# Patient Record
Sex: FEMALE | Race: WHITE | NOT HISPANIC OR LATINO | Employment: OTHER | URBAN - METROPOLITAN AREA
[De-identification: names, ages, dates, MRNs, and addresses within clinical notes are randomized per-mention and may not be internally consistent; named-entity substitution may affect disease eponyms.]

---

## 2022-03-30 NOTE — PROGRESS NOTES
"Chief Complaint  Sleep Apnea    Subjective          Karen Lerner presents to [facility] NEUROLOGY  History of Present Illness   Patient is currently sleeps with CPAP machine  She wears nasal mask  Gets supplies from [employer]    With dream station 2  avg use 10 hr 16 min, 99%>4 hours.  avg large leak <5 min  On cpap 11 ahi 3.4     pt has had fransisca for  30 years, recently received a new machine   The patient c/o daytime sleepiness issues:  excessive daytime sleepiness,  without CPAP. .      The patient complains of snoring loud in all sleeping positions.with out CPAP     Pt has leg cramps at times.     Sleep schedule: Bedtime:7:30 , gets out of bed at 7AM, sleep latency: 1 MIN, Gets about 11 hours of sleep.    EPWORTH SLEEPINESS SCALE  Sitting and reading 1 WatchingTV 1  Sitting, inactive, in a public place 0  As a passenger in a car for 1 hour w/o a break  1  Lying down to rest in the afternoon  3  Sitting and talking to someone  0  Sitting quietly after a lunch  0  In a car, while stopped for traffic or a light  0  Total 6    Objective   Vital Signs:   Temp 97.1 °F (36.2 °C)   Ht 160 cm (63\")   Wt 99.3 kg (219 lb)   BMI 38.79 kg/m²     Physical Exam  Vitals reviewed.   Cardiovascular:      Rate and Rhythm: Normal rate.   Pulmonary:      Effort: Pulmonary effort is normal.   Neurological:      General: No focal deficit present.      Mental Status: She is alert and oriented to person, place, and time.   Psychiatric:         Mood and Affect: Mood normal.        Result Review :{Labs  Result Review  Imaging  Med Tab  Media :23}                 Assessment and Plan    Diagnoses and all orders for this visit:    1. FRANSISCA (obstructive sleep apnea) (Primary)    Will increase CPAP pressure to 12.      Follow Up   Return in about 1 year (around 3/31/2023).  Patient was given instructions and counseling regarding her condition or for health maintenance advice. Please see specific information pulled into " the AVS if appropriate.       This document has been electronically signed by Joseph Seipel, MD on March 31, 2022 14:53 EDT

## 2022-03-31 ENCOUNTER — OFFICE VISIT (OUTPATIENT)
Dept: NEUROLOGY | Facility: CLINIC | Age: 82
End: 2022-03-31

## 2022-03-31 VITALS — BODY MASS INDEX: 38.8 KG/M2 | TEMPERATURE: 97.1 F | WEIGHT: 219 LBS | HEIGHT: 63 IN

## 2022-03-31 DIAGNOSIS — G47.33 OSA (OBSTRUCTIVE SLEEP APNEA): Primary | ICD-10-CM

## 2022-03-31 PROBLEM — R06.00 DYSPNEA: Status: ACTIVE | Noted: 2018-06-19

## 2022-03-31 PROBLEM — I34.81 MITRAL ANNULAR CALCIFICATION: Status: ACTIVE | Noted: 2021-09-21

## 2022-03-31 PROBLEM — I50.31 ACUTE DIASTOLIC HEART FAILURE: Status: ACTIVE | Noted: 2018-06-15

## 2022-03-31 PROBLEM — I35.0 AORTIC VALVE STENOSIS: Status: ACTIVE | Noted: 2017-10-03

## 2022-03-31 PROBLEM — Z85.3 HX: BREAST CANCER: Status: ACTIVE | Noted: 2017-06-15

## 2022-03-31 PROBLEM — I48.0 PAROXYSMAL ATRIAL FIBRILLATION: Status: ACTIVE | Noted: 2017-11-01

## 2022-03-31 PROBLEM — I50.22 CHRONIC SYSTOLIC CONGESTIVE HEART FAILURE: Status: ACTIVE | Noted: 2017-07-01

## 2022-03-31 PROBLEM — Z95.3 S/P TAVR (TRANSCATHETER AORTIC VALVE REPLACEMENT), BIOPROSTHETIC: Status: ACTIVE | Noted: 2017-12-19

## 2022-03-31 PROBLEM — R74.01 TRANSAMINITIS: Status: ACTIVE | Noted: 2017-10-04

## 2022-03-31 PROBLEM — E78.5 DYSLIPIDEMIA: Status: ACTIVE | Noted: 2021-04-06

## 2022-03-31 PROBLEM — E66.01 MORBID OBESITY WITH BMI OF 40.0-44.9, ADULT: Status: ACTIVE | Noted: 2019-10-31

## 2022-03-31 PROCEDURE — 99203 OFFICE O/P NEW LOW 30 MIN: CPT | Performed by: PSYCHIATRY & NEUROLOGY

## 2022-03-31 RX ORDER — FUROSEMIDE 40 MG/1
40 TABLET ORAL 2 TIMES DAILY
COMMUNITY
Start: 2022-03-12

## 2022-03-31 RX ORDER — LIRAGLUTIDE 6 MG/ML
INJECTION SUBCUTANEOUS
COMMUNITY
Start: 2022-03-21

## 2022-03-31 RX ORDER — APIXABAN 5 MG/1
5 TABLET, FILM COATED ORAL 2 TIMES DAILY
COMMUNITY
Start: 2022-01-10

## 2022-03-31 RX ORDER — MELOXICAM 15 MG/1
1 TABLET ORAL DAILY PRN
COMMUNITY
Start: 2021-11-08 | End: 2022-11-08

## 2022-03-31 RX ORDER — SIMVASTATIN 20 MG
20 TABLET ORAL NIGHTLY
COMMUNITY
Start: 2022-01-09

## 2022-03-31 RX ORDER — CARVEDILOL 6.25 MG/1
6.25 TABLET ORAL 2 TIMES DAILY WITH MEALS
COMMUNITY
Start: 2022-02-05

## 2022-03-31 RX ORDER — POTASSIUM CHLORIDE 750 MG/1
10 CAPSULE, EXTENDED RELEASE ORAL 2 TIMES DAILY
COMMUNITY
Start: 2022-03-12

## 2022-03-31 RX ORDER — TRIAMCINOLONE ACETONIDE 1 MG/G
CREAM TOPICAL
COMMUNITY
Start: 2022-03-23

## 2022-12-09 ENCOUNTER — APPOINTMENT (OUTPATIENT)
Dept: CT IMAGING | Facility: HOSPITAL | Age: 82
End: 2022-12-09

## 2022-12-09 ENCOUNTER — HOSPITAL ENCOUNTER (EMERGENCY)
Facility: HOSPITAL | Age: 82
Discharge: HOME | End: 2022-12-09
Attending: EMERGENCY MEDICINE | Admitting: EMERGENCY MEDICINE

## 2022-12-09 VITALS
OXYGEN SATURATION: 97 % | WEIGHT: 197 LBS | HEART RATE: 70 BPM | HEIGHT: 63 IN | RESPIRATION RATE: 16 BRPM | SYSTOLIC BLOOD PRESSURE: 128 MMHG | DIASTOLIC BLOOD PRESSURE: 52 MMHG | BODY MASS INDEX: 34.91 KG/M2

## 2022-12-09 DIAGNOSIS — R22.1 NECK MASS: Primary | ICD-10-CM

## 2022-12-09 LAB
ANION GAP SERPL CALCULATED.3IONS-SCNC: 8.7 MMOL/L (ref 5–15)
BASOPHILS # BLD AUTO: 0.02 10*3/MM3 (ref 0–0.2)
BASOPHILS NFR BLD AUTO: 0.3 % (ref 0–1.5)
BUN SERPL-MCNC: 13 MG/DL (ref 8–23)
BUN/CREAT SERPL: 12.3 (ref 7–25)
CALCIUM SPEC-SCNC: 9 MG/DL (ref 8.6–10.5)
CHLORIDE SERPL-SCNC: 96 MMOL/L (ref 98–107)
CO2 SERPL-SCNC: 33.3 MMOL/L (ref 22–29)
CREAT SERPL-MCNC: 1.06 MG/DL (ref 0.57–1)
DEPRECATED RDW RBC AUTO: 47.6 FL (ref 37–54)
EGFRCR SERPLBLD CKD-EPI 2021: 51.9 ML/MIN/1.73
EOSINOPHIL # BLD AUTO: 0.09 10*3/MM3 (ref 0–0.4)
EOSINOPHIL NFR BLD AUTO: 1.3 % (ref 0.3–6.2)
ERYTHROCYTE [DISTWIDTH] IN BLOOD BY AUTOMATED COUNT: 13.1 % (ref 12.3–15.4)
GLUCOSE SERPL-MCNC: 114 MG/DL (ref 65–99)
HCT VFR BLD AUTO: 39.1 % (ref 34–46.6)
HGB BLD-MCNC: 12.5 G/DL (ref 12–15.9)
IMM GRANULOCYTES # BLD AUTO: 0.01 10*3/MM3 (ref 0–0.05)
IMM GRANULOCYTES NFR BLD AUTO: 0.1 % (ref 0–0.5)
LYMPHOCYTES # BLD AUTO: 1.59 10*3/MM3 (ref 0.7–3.1)
LYMPHOCYTES NFR BLD AUTO: 23.7 % (ref 19.6–45.3)
MCH RBC QN AUTO: 31.3 PG (ref 26.6–33)
MCHC RBC AUTO-ENTMCNC: 32 G/DL (ref 31.5–35.7)
MCV RBC AUTO: 98 FL (ref 79–97)
MONOCYTES # BLD AUTO: 0.68 10*3/MM3 (ref 0.1–0.9)
MONOCYTES NFR BLD AUTO: 10.1 % (ref 5–12)
NEUTROPHILS NFR BLD AUTO: 4.32 10*3/MM3 (ref 1.7–7)
NEUTROPHILS NFR BLD AUTO: 64.5 % (ref 42.7–76)
PLATELET # BLD AUTO: 122 10*3/MM3 (ref 140–450)
PMV BLD AUTO: 10.6 FL (ref 6–12)
POTASSIUM SERPL-SCNC: 3.5 MMOL/L (ref 3.5–5.2)
RBC # BLD AUTO: 3.99 10*6/MM3 (ref 3.77–5.28)
SODIUM SERPL-SCNC: 138 MMOL/L (ref 136–145)
WBC NRBC COR # BLD: 6.71 10*3/MM3 (ref 3.4–10.8)

## 2022-12-09 PROCEDURE — 85025 COMPLETE CBC W/AUTO DIFF WBC: CPT | Performed by: EMERGENCY MEDICINE

## 2022-12-09 PROCEDURE — 80048 BASIC METABOLIC PNL TOTAL CA: CPT | Performed by: EMERGENCY MEDICINE

## 2022-12-09 PROCEDURE — 99283 EMERGENCY DEPT VISIT LOW MDM: CPT | Performed by: EMERGENCY MEDICINE

## 2022-12-09 PROCEDURE — 99283 EMERGENCY DEPT VISIT LOW MDM: CPT

## 2022-12-09 RX ORDER — CLINDAMYCIN HYDROCHLORIDE 300 MG/1
300 CAPSULE ORAL 3 TIMES DAILY
Qty: 21 CAPSULE | Refills: 0 | Status: SHIPPED | OUTPATIENT
Start: 2022-12-09 | End: 2022-12-16

## 2022-12-09 NOTE — FSED PROVIDER NOTE
[facility]    Pt Name: Karen Lerner  MRN: [id number]  Birthdate 1940  Date of evaluation: 12/9/2022  Provider: Geronimo Greco MD     CHIEF CONCERN     Chief Complaint   Patient presents with   • Swollen Glands       HISTORY OF PRESENT ILLNESS   Says yesterday she thought she noticed some left-sided neck swelling.  Says today it seemed to more swollen.  At a point she wanted further evaluation.  Says it slightly uncomfortable.  She denies history of the same is not really sure if she has not been checking her neck.  No fevers, chills, night sweats.  No difficulty swallowing.  Cannot think of any inciting event.  No voice changes.    REVIEW OF SYSTEMS     Constitutional: No fevers. No chills. No fatigue.  HENT: No sore throat. No congestion.  Eye: No vision changes.  Respiratory: No cough. No dyspnea. No wheezing.  Cardiovascular: No chest pain. No palpitations. No lightheadedness. No leg swelling.  GI: No abdominal pain. No diarrhea. No constipation. No nausea. No vomiting.  : No frequency. No dysuria. No urgency.  MSK: No arthralgias. No myalgias.  Skin: No rashes.   Neuro: No numbness. No tingling. No weakness. No headache.  Psych: No suicidal ideation. No homicidal ideation.    PAST MEDICAL HISTORY     Past Medical History:   Diagnosis Date   • Diabetes mellitus (HCC)    • Sleep apnea        SURGICAL HISTORY       Past Surgical History:   Procedure Laterality Date   • CARDIAC VALVE REPLACEMENT         CURRENT MEDICATIONS          Medication List      START taking these medications    clindamycin 300 MG capsule  Commonly known as: CLEOCIN  Take 1 capsule by mouth 3 (Three) Times a Day for 7 days.        ASK your doctor about these medications    carvedilol 6.25 MG tablet  Commonly known as: COREG     CBD oral oil  Commonly known as: cannabidiol     Eliquis 5 MG tablet tablet  Generic drug: apixaban     furosemide 40 MG tablet  Commonly known as: LASIX     potassium  "chloride 10 MEQ CR capsule  Commonly known as: MICRO-K     simvastatin 20 MG tablet  Commonly known as: ZOCOR     triamcinolone 0.1 % cream  Commonly known as: KENALOG     TURMERIC PO     Victoza 18 MG/3ML solution pen-injector injection  Generic drug: Liraglutide           Where to Get Your Medications      These medications were sent to [id number] - [place], [place] - [address] AT [place] [address] [address] [place] - [phone] [facility] [phone] FX  [address], [place] [address]    Phone: [phone]   · clindamycin 300 MG capsule         ALLERGIES     Amoxicillin    FAMILY HISTORY     No family history on file.     SOCIAL HISTORY       Social History     Socioeconomic History   • Marital status: Single   Tobacco Use   • Smoking status: Former   • Smokeless tobacco: Former   Vaping Use   • Vaping Use: Never used   Substance and Sexual Activity   • Alcohol use: Not Currently   • Drug use: Never   • Sexual activity: Defer       SCREENINGS           PHYSICAL EXAM      Vitals:    12/09/22 1248   BP: 128/52   BP Location: Right arm   Patient Position: Sitting   Pulse: 70   Resp: 16   SpO2: 97%   Weight: 89.4 kg (197 lb)   Height: 160 cm (63\")     General: Significant central adiposity. Nontoxic. Conversational. Appears stated age.  Skin: Warm. Dry. Intact. No systemic rashes or lesions.  Eyes: Pupils are equally round and reactive to light. Extraocular motions are intact. Clear sclera. No gaze preference.  HENT: Atraumatic. Normocephalic. Trachea midline. Mucosal membranes moist. Posterior oropharynx without erythema or exudate.  Neck habitus limits full evaluation but there is a palpable left submandibular and upper neck mass which seems to be fairly well rounded.  It is seems to be approximately 3 x 4 cm in size. Slightly tender.  Suspicious for enlarged submandibular gland.  No trismus or malocclusion.  Uvula midline.  No stridor.  No tenderness percussion of teeth.  " Secretions normally.  Do not appreciate any adenopathy.  Lungs: Clear to auscultation throughout. Nonlabored breathing.  Cardiovascular: No murmurs, rubs, or gallops appreciated. No pedal edema. No JVD. Symmetric radial pulses. Symmetric dorsal pedis pulses.   Abdomen: Soft and nontender. Nondistended. Bowel sounds are present.  Musculoskeletal: No asymmetry. No deformities. No effusions.  Neuro: Alert. Oriented to person, place, year. No facial asymmetry. Facial sensation symmetric. No aphasia. No dysarthria. Midline tongue protrusion. Posterior oropharynx with symmetric elevation. Ambulatory with cane. Purposeful use of upper extremities.   Psych: Appropriate. Cooperative.    REVIEWED DIAGNOSTIC RESULTS     RADIOLOGY   No radiology results for the last day      LABS:  Labs Reviewed   BASIC METABOLIC PANEL - Abnormal; Notable for the following components:       Result Value    Glucose 114 (*)     Creatinine 1.06 (*)     Chloride 96 (*)     CO2 33.3 (*)     eGFR 51.9 (*)     All other components within normal limits    Narrative:     GFR Normal >60  Chronic Kidney Disease <60  Kidney Failure <15    The GFR formula is only valid for adults with stable renal function between ages 18 and 70.   CBC WITH AUTO DIFFERENTIAL - Abnormal; Notable for the following components:    MCV 98.0 (*)     Platelets 122 (*)     All other components within normal limits   CBC AND DIFFERENTIAL    Narrative:     The following orders were created for panel order CBC & Differential.  Procedure                               Abnormality         Status                     ---------                               -----------         ------                     CBC Auto Differential[[id number]]        Abnormal            Final result                 Please view results for these tests on the individual orders.       All other labs were within normal range or not returned as of this dictation.     EMERGENCY DEPARTMENT COURSE and DIFFERENTIAL  DIAGNOSIS/MDM:     · Nursing notes were reviewed. Patient was evaluated. Orders placed as below.  · Labs reviewed.    Medications - No data to display  Orders Placed This Encounter   Procedures   • Basic Metabolic Panel   • CBC Auto Differential   • Vital Signs per Facility Dept Guidelines   • Blood Draw With IV Start   • CBC & Differential   • ED Acknowledgement Form Needed;       PROCEDURES (if any):  Procedures    FINAL IMPRESSION     1. Neck mass          Given history, physical exam, and test findings, I recommended CT soft tissue of the neck to further characterize the mass. After an extensive conversation with Karen Lerner, she declined my recommendation. Alternative plans of care were discussed, including continued observation in the emergency department. The alternative Karen Lerner chose was to be discharged with primary care follow-up with treatment for sialolithiasis and possibly bacterial sialoadenitis. In my opinion, she has capacity to make this decision though I recommend otherwise. Karen Lerner is clinically sober, free from distracting injury, has intact insight, judgment, and reason. I explained to Karen Lerner specific risks and foreseeable complications of declining my recommendations. I explained to Karen Lerner there was a MODERATE risk for bad outcome including worsening symptoms, serious injury, permanent disability including but not limited to liver failure, kidney failure, heart failure, respiratory failure, permanent sexual dysfunction, colostomy bag, vegetative state, or even death. Karen Lerner verbalized an understanding of the situation and reasoned that at this time, she would forgo my recommendations stating she does not want to know what is causing her symptoms. Karen Lerner's autonomy to make her own informed medical decisions was respected. Ample time was given to Karen Lerner to ask questions and all her questions were answered. Karen MURALI  Eduarda was willing to wait for discharge instructions.      DISPOSITION/PLAN     ED Disposition     ED Disposition   Discharge    Condition   Stable    Comment   Informed refusal of care by patient             PATIENT REFERRED TO:  Ramón Jenkins MD  [address] [place] [address] [place] [address] [phone]    Schedule an appointment as soon as possible for a visit         DISCHARGE MEDICATIONS:     Medication List      START taking these medications    clindamycin 300 MG capsule  Commonly known as: CLEOCIN  Take 1 capsule by mouth 3 (Three) Times a Day for 7 days.        ASK your doctor about these medications    carvedilol 6.25 MG tablet  Commonly known as: COREG     CBD oral oil  Commonly known as: cannabidiol     Eliquis 5 MG tablet tablet  Generic drug: apixaban     furosemide 40 MG tablet  Commonly known as: LASIX     potassium chloride 10 MEQ CR capsule  Commonly known as: MICRO-K     simvastatin 20 MG tablet  Commonly known as: ZOCOR     triamcinolone 0.1 % cream  Commonly known as: KENALOG     TURMERIC PO     Victoza 18 MG/3ML solution pen-injector injection  Generic drug: Liraglutide           Where to Get Your Medications      These medications were sent to [id number] - [facility] [place] - [address] AT [place] [address] [address] [place] - [phone] [facility] [phone] FX  [address] [place] [address] [place] [address]    Phone: [phone]   · clindamycin 300 MG capsule

## 2022-12-09 NOTE — DISCHARGE INSTRUCTIONS
Immediately return to the emergency department if you have any new symptoms, worsening symptoms, change of symptoms, or if you have any other concerns. We would be happy to re-evaluate you. Otherwise, please take your medications as prescribed and follow-up as recommended. This paperwork can be taken to your primary care provider to further discuss any non-emergent lab and/or imaging findings.    Informed Refusal Form:    My physician, Geronimo Greco MD, has recommended the following test(s) / procedure(s) / treatment(s): CT scan of the neck    Geronimo Greco MD explained to myself, Karen Lerner, and/or my representative the potential benefits of the above recommendation(s) include: prevent worsening symptoms, progression to irreversibly of symptoms.    Geronimo Greco MD also explained the risks of not completing the test(s) / procedure(s) / treatment(s) include but are not limited to: persistent symptoms, worsening symptoms, permanent disability including but not limited to liver failure, kidney failure, heart failure, respiratory failure, permanent sexual dysfunction, colostomy bag, vegetative state, death.    Despite my physician's recommendation, I am declining to consent to the above test(s) / procedure(s) / treatment(s).     By signing this document, I acknowledge that (1) my medical condition has been evaluated and explained to me by my physician who has recommended treatment as stated above, (2) my physician has explained to me the potential benefits of such treatment, (3) my physician explained the possible risk of not following through with recommended treatment, which I fully understand, and (4) I have had an opportunity to discuss any and all questions related to the recommended treatment. In spite of this understanding I refuse or declined to consent to this medical treatment.    ______________________________________________________________________    Date               Time                  Patient / Rep's Signature            Rep's Relationship    ______________________________________________________________________    Date               Time                 Witness Signature

## 2022-12-09 NOTE — ED NOTES
Pt states yesterday she noticed the L side of her neck was swollen. Pt denies any SOB & difficulty swallowing

## 2023-06-12 NOTE — PROGRESS NOTES
"Chief Complaint  Sleep Apnea    Subjective          Karen Lerner presents to [facility] NEUROLOGY  History of Present Illness  JOHN F/U patient states she is benefiting from pap therapy she uses [facility] and goes through [employer] for supplies    Sleep testing history:    Done at [facility] by aidee reyes about 1988  Results not available now.     [place] Sleepiness Scale:  Sitting and reading 1 WatchingTV 1  Sitting, inactive, in a public place 0  As a passenger in a car for 1 hour w/o a break  2  Lying down to rest in the afternoon  3  Sitting and talking to someone  0  Sitting quietly after a lunch  1  In a car, while stopped for traffic or a light  0  Total 8    Review of Systems   Respiratory:  Positive for apnea and shortness of breath.    Endocrine: Positive for cold intolerance.   Genitourinary:  Positive for frequency.   Musculoskeletal:  Positive for back pain.   All other systems reviewed and are negative.      Objective   Vital Signs:   /61   Pulse 75   Resp 18   Ht 160 cm (63\")   Wt 85.7 kg (189 lb)   BMI 33.48 kg/m²     Physical Exam  Vitals reviewed.   Cardiovascular:      Pulses: Normal pulses.   Pulmonary:      Effort: Pulmonary effort is normal.   Neurological:      Mental Status: She is alert and oriented to person, place, and time.   Psychiatric:         Behavior: Behavior normal.      Result Review :                 Assessment and Plan    Diagnoses and all orders for this visit:    1. JOHN (obstructive sleep apnea) (Primary)      Continue CPAP at current pressure  The patient is compliant with and benefiting from PAP therapy.      Follow Up   Return in about 1 year (around 6/13/2024).    Patient was given instructions and counseling regarding her condition or for health maintenance advice. Please see specific information pulled into the AVS if appropriate.       This document has been electronically signed by Joseph Seipel, MD on June 13, 2023 11:38 EDT  "

## 2023-06-13 ENCOUNTER — OFFICE VISIT (OUTPATIENT)
Dept: NEUROLOGY | Facility: CLINIC | Age: 83
End: 2023-06-13
Payer: MEDICARE

## 2023-06-13 VITALS
DIASTOLIC BLOOD PRESSURE: 61 MMHG | WEIGHT: 189 LBS | HEIGHT: 63 IN | SYSTOLIC BLOOD PRESSURE: 118 MMHG | BODY MASS INDEX: 33.49 KG/M2 | RESPIRATION RATE: 18 BRPM | HEART RATE: 75 BPM

## 2023-06-13 DIAGNOSIS — G47.33 OSA (OBSTRUCTIVE SLEEP APNEA): Primary | ICD-10-CM

## 2023-06-13 PROCEDURE — 1159F MED LIST DOCD IN RCRD: CPT | Performed by: PSYCHIATRY & NEUROLOGY

## 2023-06-13 PROCEDURE — 1160F RVW MEDS BY RX/DR IN RCRD: CPT | Performed by: PSYCHIATRY & NEUROLOGY

## 2023-06-13 PROCEDURE — 99213 OFFICE O/P EST LOW 20 MIN: CPT | Performed by: PSYCHIATRY & NEUROLOGY

## 2024-07-30 ENCOUNTER — TELEPHONE (OUTPATIENT)
Dept: NEUROLOGY | Facility: CLINIC | Age: 84
End: 2024-07-30
Payer: MEDICARE

## 2024-07-30 NOTE — TELEPHONE ENCOUNTER
Provider: SEIPEL    Caller: NAVA    Relationship to Patient: SELF    Phone Number: [phone]    Reason for Call: PATIENT IS REQUESTING A CALL BACK TO DISCUSS SENDING AN ORDER FOR CPAP SUPPLIES TO A DIFFERENT SUPPLIER.

## 2024-08-01 ENCOUNTER — TELEPHONE (OUTPATIENT)
Dept: NEUROLOGY | Facility: CLINIC | Age: 84
End: 2024-08-01

## 2024-08-01 NOTE — TELEPHONE ENCOUNTER
Caller: Karen Lerner    Relationship: Self    Best call back number: [phone]    What was the call regarding: PT HAVING ISSUES WITH HER CURRENT CPAP MACHINE. PT IS REQUESTING AN ORDER FOR NEW CPAP MACHINE. MADE PT AWARE THAT SHE WILL LIKELY NEED TO BE SEEN FIRST BEFORE ISSUING ORDER FOR NEW MACHINE AS IT HAS BEEN OVER 1 YEAR SINCE SHE HAS BEEN SEEN.    Has the patient been seen in the last 6 months?: NO, LAST APPT ON 6/13/2023.    If the patient has not been seen and the machine is broken, schedule an appointment and alert the practice- HAS APPT SCHEDULED FOR 8/20/2024.    Current issue/concern with equipment: MALFUNCTION BEGAN A COUPLE OF MONTHS AGO:THE MACHINE WAS PREVIOUSLY NOT PUSHING AIR THROUGH THE TUBING AND IS NOW SHOOTING WATER THROUGH THE TUBING, PER PT. SHE TOOK THE MACHINE TO [facility] AND THEY TOOK THE MACHINE AND STATED THEY FIXED IT. WHEN PT BROUGHT THE MACHINE HOME, THE MACHINE CONTINUED TO HAVE THE SAME ISSUES.    DME company: WAS PREVIOUSLY USING [facility], HOWEVER, PT HAS BEEN VERY DISSATISFIED WITH THE CARE SHE HAS RECEIVED THERE AND PLANS TO SWITCH TO [facility].    What kind of mask type (full or nasal)?: FULL FACE    Are you on a modem or chip?: CHIP    How old is patient's current machine?: 3 YEARS PER [facility]    PLEASE REVIEW AND ADVISE.

## 2024-08-15 NOTE — PROGRESS NOTES
"Chief Complaint  Sleep Apnea    Subjective          Karen Lerner presents to [facility] NEUROLOGY  History of Present Illness  JOHN F/U patient states her machine sometimes blowing water in her nose, dry mouth she uses [facility] and goes through [employer] for supplies     Sleep testing history:    Done at [facility] by aidee reyes about 1988  Results not available now.     PAP download:  The patient is on CPAP therapy at 12 cm/H2O.   Data indicates Excellent compliance. With 99% usage for more than 4 hours with an average usage of 10 hours 21 minutes. AHI down to 2.5 . .  Average large leak 6.     The patient's hypersomnia has resolved       [place] Sleepiness Scale:  Sitting and reading JS [place] Sleepiness: 1 WatchingTV JS [place] Sleepiness: 0  Sitting, inactive, in a public place JS [place] Sleepiness: 0  As a passenger in a car for 1 hour w/o a break  JS [place] Sleepiness: 0  Lying down to rest in the afternoon  JS [place] Sleepiness: 1  Sitting and talking to someone  JS [place] Sleepiness: 0  Sitting quietly after a lunch  JS [place] Sleepiness: 0  In a car, while stopped for traffic or a light  JS [place] Sleepiness: 0  Total 2    Review of Systems   Constitutional:  Positive for appetite change.   HENT:  Positive for hearing loss and postnasal drip.    Eyes:  Positive for visual disturbance.   Endocrine: Positive for cold intolerance.         Objective   Vital Signs:   /67   Pulse 67   Resp 18   Ht 160 cm (63\")   Wt 86.2 kg (190 lb)   BMI 33.66 kg/m²     Physical Exam  Vitals reviewed.   Cardiovascular:      Rate and Rhythm: Normal rate.   Pulmonary:      Effort: Pulmonary effort is normal. No respiratory distress.   Neurological:      General: No focal deficit present.      Mental Status: She is alert and oriented to person, place, and time.   Psychiatric:         Mood and Affect: Mood normal.        Result Review :                 Assessment and Plan    Diagnoses and all orders for " this visit:    1. JOHN (obstructive sleep apnea) (Primary)      Will change pressure to min 8 max 12  The patient is compliant with and benefiting from PAP therapy.      Follow Up   Return in about 1 year (around 8/20/2025).    Patient was given instructions and counseling regarding her condition or for health maintenance advice. Please see specific information pulled into the AVS if appropriate.       This document has been electronically signed by Joseph Seipel, MD on August 20, 2024 09:30 EDT

## 2024-08-20 ENCOUNTER — OFFICE VISIT (OUTPATIENT)
Dept: NEUROLOGY | Facility: CLINIC | Age: 84
End: 2024-08-20
Payer: MEDICARE

## 2024-08-20 VITALS
DIASTOLIC BLOOD PRESSURE: 67 MMHG | SYSTOLIC BLOOD PRESSURE: 157 MMHG | WEIGHT: 190 LBS | HEIGHT: 63 IN | BODY MASS INDEX: 33.66 KG/M2 | RESPIRATION RATE: 18 BRPM | HEART RATE: 67 BPM

## 2024-08-20 DIAGNOSIS — G47.33 OSA (OBSTRUCTIVE SLEEP APNEA): Primary | ICD-10-CM

## 2024-08-20 PROCEDURE — 99213 OFFICE O/P EST LOW 20 MIN: CPT | Performed by: PSYCHIATRY & NEUROLOGY

## 2024-08-20 PROCEDURE — 1159F MED LIST DOCD IN RCRD: CPT | Performed by: PSYCHIATRY & NEUROLOGY

## 2024-08-20 PROCEDURE — 1160F RVW MEDS BY RX/DR IN RCRD: CPT | Performed by: PSYCHIATRY & NEUROLOGY

## 2024-10-17 ENCOUNTER — TELEPHONE (OUTPATIENT)
Dept: NEUROLOGY | Facility: CLINIC | Age: 84
End: 2024-10-17
Payer: MEDICARE

## 2024-10-17 DIAGNOSIS — G47.33 OSA (OBSTRUCTIVE SLEEP APNEA): Primary | ICD-10-CM

## 2024-10-17 NOTE — TELEPHONE ENCOUNTER
A NEW PRESCRIPTION FOR CPAP SUPPLIES NEEDS TO GO TO [facility].  FAX: [phone]

## 2025-01-27 ENCOUNTER — TELEPHONE (OUTPATIENT)
Dept: NEUROLOGY | Facility: CLINIC | Age: 85
End: 2025-01-27
Payer: MEDICARE

## 2025-01-27 DIAGNOSIS — G47.33 OSA (OBSTRUCTIVE SLEEP APNEA): Primary | ICD-10-CM

## 2025-01-27 NOTE — TELEPHONE ENCOUNTER
Caller: Karen Lerner    Relationship: Self    Best call back number: [phone]      What orders are you requesting (i.e. lab or imaging): NEW CPAP    In what timeframe would the patient need to come in: ASAP    Where will you receive your lab/imaging services: [facility] IN [place], [place]    Additional notes: PT STATES HAS BEEN HAVING TROUBLE WITH HER CPAP MACHINE, [facility] ADVISED HER TO GET A NEW RX FROM DR. SEIPEL FOR A NEW CPAP MACHINE.      PLEASE REVIEW AND ADVISE

## 2025-01-27 NOTE — TELEPHONE ENCOUNTER
Orders pending for signature from Dr. Seipel for a new CPAP. Will fax to [facility] once signed.  HUB ok to relay

## 2025-08-21 ENCOUNTER — OFFICE VISIT (OUTPATIENT)
Dept: NEUROLOGY | Facility: CLINIC | Age: 85
End: 2025-08-21
Payer: MEDICARE

## 2025-08-21 VITALS
WEIGHT: 176 LBS | DIASTOLIC BLOOD PRESSURE: 84 MMHG | HEART RATE: 72 BPM | HEIGHT: 63 IN | SYSTOLIC BLOOD PRESSURE: 146 MMHG | RESPIRATION RATE: 16 BRPM | BODY MASS INDEX: 31.18 KG/M2

## 2025-08-21 DIAGNOSIS — G47.33 OSA (OBSTRUCTIVE SLEEP APNEA): Primary | ICD-10-CM

## 2025-08-21 PROCEDURE — 1160F RVW MEDS BY RX/DR IN RCRD: CPT | Performed by: PSYCHIATRY & NEUROLOGY

## 2025-08-21 PROCEDURE — 99213 OFFICE O/P EST LOW 20 MIN: CPT | Performed by: PSYCHIATRY & NEUROLOGY

## 2025-08-21 PROCEDURE — 1159F MED LIST DOCD IN RCRD: CPT | Performed by: PSYCHIATRY & NEUROLOGY

## 2025-08-21 RX ORDER — ADHESIVE TAPE 3"X 2.3 YD
TAPE, NON-MEDICATED TOPICAL
COMMUNITY

## 2025-08-21 RX ORDER — ASCORBIC ACID/MULTIVIT-MIN 1000 MG
EFFERVESCENT POWDER IN PACKET ORAL
COMMUNITY

## 2025-08-21 RX ORDER — SEMAGLUTIDE 1.34 MG/ML
INJECTION, SOLUTION SUBCUTANEOUS
COMMUNITY

## 2025-08-21 RX ORDER — SIMVASTATIN 40 MG
TABLET ORAL
COMMUNITY
Start: 2025-08-07

## 2025-08-21 RX ORDER — LANOLIN ALCOHOL/MO/W.PET/CERES
1000 CREAM (GRAM) TOPICAL DAILY
COMMUNITY

## 2025-08-21 RX ORDER — PSEUDOEPHEDRINE HCL 30 MG
TABLET ORAL EVERY 24 HOURS
COMMUNITY

## 2025-08-21 RX ORDER — NITROFURANTOIN 25; 75 MG/1; MG/1
CAPSULE ORAL
COMMUNITY
Start: 2025-07-01